# Patient Record
Sex: FEMALE | Race: ASIAN | URBAN - METROPOLITAN AREA
[De-identification: names, ages, dates, MRNs, and addresses within clinical notes are randomized per-mention and may not be internally consistent; named-entity substitution may affect disease eponyms.]

---

## 2018-01-16 ENCOUNTER — APPOINTMENT (OUTPATIENT)
Dept: LAB | Age: 58
End: 2018-01-16
Attending: FAMILY MEDICINE
Payer: COMMERCIAL

## 2018-01-16 ENCOUNTER — OFFICE VISIT (OUTPATIENT)
Dept: FAMILY MEDICINE CLINIC | Facility: CLINIC | Age: 58
End: 2018-01-16

## 2018-01-16 VITALS
HEART RATE: 80 BPM | WEIGHT: 115.38 LBS | TEMPERATURE: 98 F | HEIGHT: 60.39 IN | DIASTOLIC BLOOD PRESSURE: 66 MMHG | RESPIRATION RATE: 16 BRPM | BODY MASS INDEX: 22.36 KG/M2 | SYSTOLIC BLOOD PRESSURE: 98 MMHG

## 2018-01-16 DIAGNOSIS — Z12.31 ENCOUNTER FOR SCREENING MAMMOGRAM FOR BREAST CANCER: ICD-10-CM

## 2018-01-16 DIAGNOSIS — Z00.00 ROUTINE PHYSICAL EXAMINATION: ICD-10-CM

## 2018-01-16 DIAGNOSIS — Z12.11 COLON CANCER SCREENING: ICD-10-CM

## 2018-01-16 DIAGNOSIS — Z00.00 ROUTINE PHYSICAL EXAMINATION: Primary | ICD-10-CM

## 2018-01-16 LAB
ANION GAP SERPL CALC-SCNC: 11 MMOL/L (ref 0–18)
BUN SERPL-MCNC: 11 MG/DL (ref 8–20)
BUN/CREAT SERPL: 17.2 (ref 10–20)
CALCIUM SERPL-MCNC: 9.7 MG/DL (ref 8.5–10.5)
CHLORIDE SERPL-SCNC: 104 MMOL/L (ref 95–110)
CHOLEST SERPL-MCNC: 221 MG/DL (ref 110–200)
CO2 SERPL-SCNC: 25 MMOL/L (ref 22–32)
CREAT SERPL-MCNC: 0.64 MG/DL (ref 0.5–1.5)
GLUCOSE SERPL-MCNC: 110 MG/DL (ref 70–99)
HDLC SERPL-MCNC: 78 MG/DL
LDLC SERPL CALC-MCNC: 122 MG/DL (ref 0–99)
NONHDLC SERPL-MCNC: 143 MG/DL
OSMOLALITY UR CALC.SUM OF ELEC: 290 MOSM/KG (ref 275–295)
POTASSIUM SERPL-SCNC: 3.9 MMOL/L (ref 3.3–5.1)
SODIUM SERPL-SCNC: 140 MMOL/L (ref 136–144)
TRIGL SERPL-MCNC: 107 MG/DL (ref 1–149)

## 2018-01-16 PROCEDURE — 80048 BASIC METABOLIC PNL TOTAL CA: CPT

## 2018-01-16 PROCEDURE — 90715 TDAP VACCINE 7 YRS/> IM: CPT | Performed by: FAMILY MEDICINE

## 2018-01-16 PROCEDURE — 90472 IMMUNIZATION ADMIN EACH ADD: CPT | Performed by: FAMILY MEDICINE

## 2018-01-16 PROCEDURE — 36415 COLL VENOUS BLD VENIPUNCTURE: CPT

## 2018-01-16 PROCEDURE — 90686 IIV4 VACC NO PRSV 0.5 ML IM: CPT | Performed by: FAMILY MEDICINE

## 2018-01-16 PROCEDURE — 90471 IMMUNIZATION ADMIN: CPT | Performed by: FAMILY MEDICINE

## 2018-01-16 PROCEDURE — 99396 PREV VISIT EST AGE 40-64: CPT | Performed by: FAMILY MEDICINE

## 2018-01-16 PROCEDURE — 80061 LIPID PANEL: CPT

## 2018-01-16 PROCEDURE — 86803 HEPATITIS C AB TEST: CPT

## 2018-01-16 NOTE — PROGRESS NOTES
HPI:    Patient ID: Carolyn Fonseca is a 56year old female. HPI    Review of Systems   Constitutional: Negative. Respiratory: Negative. Cardiovascular: Negative. Gastrointestinal: Negative. Skin: Negative. Neurological: Negative. encounter    Imaging & Referrals:  TETANUS, DIPHTHERIA TOXOIDS AND ACELLULAR PERTUSIS VACCINE (TDAP), >7 YEARS, IM USE  FLULAVAL INFLUENZA VACCINE QUAD PRESERVATIVE FREE 0.5 ML  EVALUATE & TREAT, GASTRO (INTERNAL)  LAM SCREENING BILAT (CPT=77067)       ID#

## 2018-01-17 ENCOUNTER — HOSPITAL ENCOUNTER (OUTPATIENT)
Dept: MAMMOGRAPHY | Age: 58
Discharge: HOME | End: 2018-01-17
Attending: FAMILY MEDICINE
Payer: COMMERCIAL

## 2018-01-17 DIAGNOSIS — Z12.31 ENCOUNTER FOR SCREENING MAMMOGRAM FOR BREAST CANCER: ICD-10-CM

## 2018-01-17 LAB — HCV AB SERPL QL IA: NONREACTIVE

## 2018-01-17 PROCEDURE — 77067 SCR MAMMO BI INCL CAD: CPT | Performed by: FAMILY MEDICINE

## 2018-01-24 ENCOUNTER — TELEPHONE (OUTPATIENT)
Dept: FAMILY MEDICINE CLINIC | Facility: CLINIC | Age: 58
End: 2018-01-24

## 2018-01-26 ENCOUNTER — TELEPHONE (OUTPATIENT)
Dept: OTHER | Age: 58
End: 2018-01-26

## 2018-01-26 NOTE — TELEPHONE ENCOUNTER
----- Message from Joaquim Marroquin RN sent at 1/24/2018  3:48 PM CST -----      ----- Message -----  From: Pearl Ivory MD  Sent: 1/16/2018   7:40 PM  To:  Em [facility]    Please let patient know labs normal except blood sugar is upper normal.  I r

## 2018-02-04 NOTE — TELEPHONE ENCOUNTER
Nurse judgement. Unsuccessful reaching pt via phone. Therefore result and MD note mailed to pt for her review.

## 2018-02-07 ENCOUNTER — NURSE TRIAGE (OUTPATIENT)
Dept: OTHER | Age: 58
End: 2018-02-07

## 2018-02-07 ENCOUNTER — OFFICE VISIT (OUTPATIENT)
Dept: FAMILY MEDICINE CLINIC | Facility: CLINIC | Age: 58
End: 2018-02-07

## 2018-02-07 VITALS
HEART RATE: 77 BPM | TEMPERATURE: 98 F | DIASTOLIC BLOOD PRESSURE: 70 MMHG | WEIGHT: 113.63 LBS | BODY MASS INDEX: 22 KG/M2 | RESPIRATION RATE: 12 BRPM | SYSTOLIC BLOOD PRESSURE: 111 MMHG

## 2018-02-07 DIAGNOSIS — R68.89 BLOOD PRESSURE ALTERATION: Primary | ICD-10-CM

## 2018-02-07 DIAGNOSIS — R73.09: ICD-10-CM

## 2018-02-07 PROCEDURE — 99214 OFFICE O/P EST MOD 30 MIN: CPT | Performed by: FAMILY MEDICINE

## 2018-02-07 PROCEDURE — 99212 OFFICE O/P EST SF 10 MIN: CPT | Performed by: FAMILY MEDICINE

## 2018-02-07 NOTE — TELEPHONE ENCOUNTER
Patient informed of results (identified name and ) and recommendations, as per Dr. EMILY ANTOINE [facility] result note. Patient voiced understanding and agrees with recommendations.

## 2018-02-07 NOTE — TELEPHONE ENCOUNTER
Action Requested: Summary for Provider     []  Critical Lab, Recommendations Needed  [] Need Additional Advice  []   FYI    []   Need Orders  [] Need Medications Sent to Pharmacy  []  Other     SUMMARY: Advised OV today with available provider and pt agree

## 2018-02-09 NOTE — PROGRESS NOTES
HPI:    Brad Okeefe is a 56year old female presents to clinic with concerns. States that her Bp has been higher than usual, she usually runs in the 90s/60s.  The other day she was working at her computer and she noticed some dizziness, she took her normal and breath sounds normal. No respiratory distress. She has no wheezes. She has no rales. Neurological: She is alert. Gait normal.   Psychiatric: Affect normal.   Vitals reviewed.           ASSESSMENT/PLAN:   Blood pressure alteration  (primary enco

## 2018-02-22 ENCOUNTER — TELEPHONE (OUTPATIENT)
Dept: GASTROENTEROLOGY | Facility: CLINIC | Age: 58
End: 2018-02-22

## 2018-02-22 ENCOUNTER — OFFICE VISIT (OUTPATIENT)
Dept: GASTROENTEROLOGY | Facility: CLINIC | Age: 58
End: 2018-02-22

## 2018-02-22 VITALS
SYSTOLIC BLOOD PRESSURE: 98 MMHG | DIASTOLIC BLOOD PRESSURE: 62 MMHG | WEIGHT: 112 LBS | HEART RATE: 66 BPM | BODY MASS INDEX: 22.58 KG/M2 | HEIGHT: 59 IN

## 2018-02-22 DIAGNOSIS — Z12.11 SCREEN FOR COLON CANCER: ICD-10-CM

## 2018-02-22 DIAGNOSIS — Z12.11 COLON CANCER SCREENING: Primary | ICD-10-CM

## 2018-02-22 DIAGNOSIS — K59.09 OTHER CONSTIPATION: Primary | ICD-10-CM

## 2018-02-22 PROCEDURE — 99243 OFF/OP CNSLTJ NEW/EST LOW 30: CPT | Performed by: INTERNAL MEDICINE

## 2018-02-22 PROCEDURE — 99212 OFFICE O/P EST SF 10 MIN: CPT | Performed by: INTERNAL MEDICINE

## 2018-02-22 RX ORDER — ECHINACEA 400 MG
CAPSULE ORAL
COMMUNITY

## 2018-02-22 RX ORDER — CHOLECALCIFEROL (VITAMIN D3) 125 MCG
100 CAPSULE ORAL DAILY
COMMUNITY

## 2018-02-22 RX ORDER — CHOLECALCIFEROL (VITAMIN D3) 50 MCG
CAPSULE ORAL
COMMUNITY

## 2018-02-22 NOTE — PATIENT INSTRUCTIONS
1. Schedule colonoscopy with IV sedation (screening)    2.  bowel prep from pharmacy (split suprep)    3. Continue all medications for procedure    4. Read all bowel prep instructions carefully    5.  AVOID seeds, nuts, popcorn, raw fruits and vegeta

## 2018-02-22 NOTE — TELEPHONE ENCOUNTER
Pt will call back to schedule    Scheduled for:  Colonoscopy 20071  Provider Name: Dr James Bustos  Date:  Wed 3/21/18  Location:  [facility]  Sedation:  IV  Time:  12:30 pm  Prep: split dose suprep, 2nd dose @ 6:30 am  Meds/Allergies Reconciled?:  NKDA  Diagnosis with

## 2018-02-22 NOTE — H&P
[address] [facility] [address] Gastroenterology                                                                                                  Clinic History and Physical     Pa Allergies:  No Known Allergies    ROS:   CONSTITUTIONAL:  negative for fevers, rigors  EYES:  negative for diplopia   RESPIRATORY:  negative for severe shortness of breath  CARDIOVASCULAR:  negative for crushing sub-sternal chest pain  GASTROINTESTIN they want to proceed with colonoscopy.     Recommend:  -Schedule colonoscopy w/IV sedation  -Prep: suprep    Colonoscopy consent: I have discussed the risks, benefits, and alternatives to colonoscopy with the patient [who demonstrated understanding], includ

## 2018-02-23 NOTE — TELEPHONE ENCOUNTER
Pt returned my call but I was unable to  call. I called back and spoke to pt and scheduled colonoscopy procedure for 3/21/18 @ 12:30 pm @ [facility] .  I explained the arrival time and location to pt and reminded her of the 2nd bowel prep dose @ 6:30 am on

## 2018-03-21 ENCOUNTER — SURGERY (OUTPATIENT)
Age: 58
End: 2018-03-21

## 2018-03-21 ENCOUNTER — HOSPITAL ENCOUNTER (OUTPATIENT)
Facility: HOSPITAL | Age: 58
Setting detail: HOSPITAL OUTPATIENT SURGERY
Discharge: HOME | End: 2018-03-21
Attending: INTERNAL MEDICINE | Admitting: INTERNAL MEDICINE
Payer: COMMERCIAL

## 2018-03-21 DIAGNOSIS — Z12.11 COLON CANCER SCREENING: ICD-10-CM

## 2018-03-21 PROCEDURE — 0DJD8ZZ INSPECTION OF LOWER INTESTINAL TRACT, VIA NATURAL OR ARTIFICIAL OPENING ENDOSCOPIC: ICD-10-PCS | Performed by: INTERNAL MEDICINE

## 2018-03-21 PROCEDURE — G0500 MOD SEDAT ENDO SERVICE >5YRS: HCPCS | Performed by: INTERNAL MEDICINE

## 2018-03-21 PROCEDURE — 45378 DIAGNOSTIC COLONOSCOPY: CPT | Performed by: INTERNAL MEDICINE

## 2018-03-21 RX ORDER — MIDAZOLAM HYDROCHLORIDE 1 MG/ML
INJECTION INTRAMUSCULAR; INTRAVENOUS
Status: DISCONTINUED | OUTPATIENT
Start: 2018-03-21 | End: 2018-03-21

## 2018-03-21 RX ORDER — SODIUM CHLORIDE 0.9 % (FLUSH) 0.9 %
10 SYRINGE (ML) INJECTION AS NEEDED
Status: DISCONTINUED | OUTPATIENT
Start: 2018-03-21 | End: 2018-03-21

## 2018-03-21 RX ORDER — SODIUM CHLORIDE, SODIUM LACTATE, POTASSIUM CHLORIDE, CALCIUM CHLORIDE 600; 310; 30; 20 MG/100ML; MG/100ML; MG/100ML; MG/100ML
INJECTION, SOLUTION INTRAVENOUS CONTINUOUS
Status: DISCONTINUED | OUTPATIENT
Start: 2018-03-21 | End: 2018-03-21

## 2018-03-21 RX ORDER — MIDAZOLAM HYDROCHLORIDE 1 MG/ML
1 INJECTION INTRAMUSCULAR; INTRAVENOUS EVERY 5 MIN PRN
Status: DISCONTINUED | OUTPATIENT
Start: 2018-03-21 | End: 2018-03-21

## 2018-03-21 NOTE — H&P
History & Physical Examination    Patient Name: Audrey Gilbert  MRN: [id number]  [facility]: [id number]  YOB: 1960    Diagnosis: screening for colon cancer      Prescriptions Prior to Admission:  Na Sulfate-K Sulfate-Mg Sulf (SUPREP BOWEL PREP KIT

## 2018-03-21 NOTE — OPERATIVE REPORT
COLONOSCOPY REPORT    Amaya Alcazar     1960 Age 56year old   PCP Jenniffer Loo MD Endoscopist Gus Barboza MD     Date of procedure: 18    Procedure: Colonoscopy     Pre-operative diagnosis: Screening    Post-operative diagnos colon showed normal vascular pattern, without evidence of angioectasias or inflammation. 6. HAILE: normal rectal tone, no masses palpated. Impression:   · Small internal hemorrhoids, otherwise normal colonoscopy to the terminal ileum.      Recommend:

## 2018-03-22 VITALS
DIASTOLIC BLOOD PRESSURE: 62 MMHG | OXYGEN SATURATION: 100 % | HEIGHT: 60 IN | SYSTOLIC BLOOD PRESSURE: 96 MMHG | HEART RATE: 51 BPM | BODY MASS INDEX: 21.01 KG/M2 | RESPIRATION RATE: 16 BRPM | WEIGHT: 107 LBS

## 2018-03-23 ENCOUNTER — TELEPHONE (OUTPATIENT)
Dept: GASTROENTEROLOGY | Facility: CLINIC | Age: 58
End: 2018-03-23

## 2018-03-26 NOTE — TELEPHONE ENCOUNTER
Entered into EPIC:Recall colon in 10 years per Dr. Von Berry. Last Colon done 3/21/2018, next due 3/21/2028. Snapshot updated.

## 2019-05-28 ENCOUNTER — OFFICE VISIT (OUTPATIENT)
Dept: FAMILY MEDICINE CLINIC | Facility: CLINIC | Age: 59
End: 2019-05-28
Payer: COMMERCIAL

## 2019-05-28 ENCOUNTER — APPOINTMENT (OUTPATIENT)
Dept: LAB | Age: 59
End: 2019-05-28
Attending: FAMILY MEDICINE
Payer: COMMERCIAL

## 2019-05-28 VITALS
BODY MASS INDEX: 20.59 KG/M2 | RESPIRATION RATE: 18 BRPM | DIASTOLIC BLOOD PRESSURE: 61 MMHG | HEIGHT: 60.5 IN | TEMPERATURE: 98 F | SYSTOLIC BLOOD PRESSURE: 93 MMHG | WEIGHT: 107.63 LBS | HEART RATE: 69 BPM

## 2019-05-28 DIAGNOSIS — R73.9 HYPERGLYCEMIA: ICD-10-CM

## 2019-05-28 DIAGNOSIS — Z12.31 ENCOUNTER FOR SCREENING MAMMOGRAM FOR BREAST CANCER: ICD-10-CM

## 2019-05-28 DIAGNOSIS — Z00.00 ROUTINE PHYSICAL EXAMINATION: Primary | ICD-10-CM

## 2019-05-28 DIAGNOSIS — Z00.00 ROUTINE PHYSICAL EXAMINATION: ICD-10-CM

## 2019-05-28 DIAGNOSIS — Z20.828 EXPOSURE TO VIRAL DISEASE: ICD-10-CM

## 2019-05-28 PROCEDURE — 80048 BASIC METABOLIC PNL TOTAL CA: CPT

## 2019-05-28 PROCEDURE — 36415 COLL VENOUS BLD VENIPUNCTURE: CPT

## 2019-05-28 PROCEDURE — 99396 PREV VISIT EST AGE 40-64: CPT | Performed by: FAMILY MEDICINE

## 2019-05-28 PROCEDURE — 83036 HEMOGLOBIN GLYCOSYLATED A1C: CPT

## 2019-05-28 PROCEDURE — 80061 LIPID PANEL: CPT

## 2019-06-04 ENCOUNTER — HOSPITAL ENCOUNTER (OUTPATIENT)
Dept: MAMMOGRAPHY | Age: 59
Discharge: HOME | End: 2019-06-04
Attending: FAMILY MEDICINE
Payer: COMMERCIAL

## 2019-06-04 DIAGNOSIS — Z12.31 ENCOUNTER FOR SCREENING MAMMOGRAM FOR BREAST CANCER: ICD-10-CM

## 2019-06-04 PROCEDURE — 77063 BREAST TOMOSYNTHESIS BI: CPT | Performed by: FAMILY MEDICINE

## 2019-06-04 PROCEDURE — 77067 SCR MAMMO BI INCL CAD: CPT | Performed by: FAMILY MEDICINE

## 2020-07-03 ENCOUNTER — OFFICE VISIT (OUTPATIENT)
Dept: FAMILY MEDICINE CLINIC | Facility: CLINIC | Age: 60
End: 2020-07-03
Payer: COMMERCIAL

## 2020-07-03 ENCOUNTER — APPOINTMENT (OUTPATIENT)
Dept: LAB | Age: 60
End: 2020-07-03
Attending: FAMILY MEDICINE
Payer: COMMERCIAL

## 2020-07-03 VITALS
HEART RATE: 64 BPM | BODY MASS INDEX: 20.81 KG/M2 | DIASTOLIC BLOOD PRESSURE: 60 MMHG | RESPIRATION RATE: 16 BRPM | HEIGHT: 60.5 IN | WEIGHT: 108.81 LBS | TEMPERATURE: 97 F | SYSTOLIC BLOOD PRESSURE: 98 MMHG

## 2020-07-03 DIAGNOSIS — Z20.828 EXPOSURE TO VIRAL DISEASE: ICD-10-CM

## 2020-07-03 DIAGNOSIS — Z00.00 ROUTINE PHYSICAL EXAMINATION: ICD-10-CM

## 2020-07-03 DIAGNOSIS — Z12.31 ENCOUNTER FOR SCREENING MAMMOGRAM FOR BREAST CANCER: Primary | ICD-10-CM

## 2020-07-03 PROBLEM — Z90.710 STATUS POST HYSTERECTOMY: Status: ACTIVE | Noted: 2020-07-03

## 2020-07-03 LAB
ANION GAP SERPL CALC-SCNC: 3 MMOL/L (ref 0–18)
BUN BLD-MCNC: 12 MG/DL (ref 7–18)
BUN/CREAT SERPL: 14.6 (ref 10–20)
CALCIUM BLD-MCNC: 9.4 MG/DL (ref 8.5–10.1)
CHLORIDE SERPL-SCNC: 107 MMOL/L (ref 98–112)
CHOLEST SMN-MCNC: 185 MG/DL (ref ?–200)
CO2 SERPL-SCNC: 32 MMOL/L (ref 21–32)
CREAT BLD-MCNC: 0.82 MG/DL (ref 0.55–1.02)
GLUCOSE BLD-MCNC: 91 MG/DL (ref 70–99)
HDLC SERPL-MCNC: 91 MG/DL (ref 40–59)
LDLC SERPL CALC-MCNC: 82 MG/DL (ref ?–100)
MEV IGG SER-ACNC: >300 AU/ML (ref 16.5–?)
NONHDLC SERPL-MCNC: 94 MG/DL (ref ?–130)
OSMOLALITY SERPL CALC.SUM OF ELEC: 293 MOSM/KG (ref 275–295)
PATIENT FASTING Y/N/NP: YES
PATIENT FASTING Y/N/NP: YES
POTASSIUM SERPL-SCNC: 4.4 MMOL/L (ref 3.5–5.1)
SODIUM SERPL-SCNC: 142 MMOL/L (ref 136–145)
TRIGL SERPL-MCNC: 61 MG/DL (ref 30–149)
TSI SER-ACNC: 3.07 MIU/ML (ref 0.36–3.74)
VLDLC SERPL CALC-MCNC: 12 MG/DL (ref 0–30)

## 2020-07-03 PROCEDURE — 90632 HEPA VACCINE ADULT IM: CPT | Performed by: FAMILY MEDICINE

## 2020-07-03 PROCEDURE — 99396 PREV VISIT EST AGE 40-64: CPT | Performed by: FAMILY MEDICINE

## 2020-07-03 PROCEDURE — 80048 BASIC METABOLIC PNL TOTAL CA: CPT

## 2020-07-03 PROCEDURE — 36415 COLL VENOUS BLD VENIPUNCTURE: CPT

## 2020-07-03 PROCEDURE — 84443 ASSAY THYROID STIM HORMONE: CPT

## 2020-07-03 PROCEDURE — 86765 RUBEOLA ANTIBODY: CPT

## 2020-07-03 PROCEDURE — 80061 LIPID PANEL: CPT

## 2020-07-03 PROCEDURE — 90471 IMMUNIZATION ADMIN: CPT | Performed by: FAMILY MEDICINE

## 2020-07-03 NOTE — PROGRESS NOTES
HPI:    Patient ID: Lea White is a 64year old female. HPI    Review of Systems   Constitutional: Negative. Respiratory: Negative. Cardiovascular: Negative. Gastrointestinal: Negative. Skin: Negative. Neurological: Negative. requested or ordered in this encounter       Imaging & Referrals:  HEPATITIS A VACCINE  [facility]ACHO 2D+3D SCREENING BILAT (CPT=77067/32988)       BE#[id number]

## 2020-07-10 ENCOUNTER — HOSPITAL ENCOUNTER (OUTPATIENT)
Dept: MAMMOGRAPHY | Age: 60
Discharge: HOME | End: 2020-07-10
Attending: FAMILY MEDICINE
Payer: COMMERCIAL

## 2020-07-10 DIAGNOSIS — Z12.31 ENCOUNTER FOR SCREENING MAMMOGRAM FOR BREAST CANCER: ICD-10-CM

## 2020-07-10 PROCEDURE — 77067 SCR MAMMO BI INCL CAD: CPT | Performed by: FAMILY MEDICINE

## 2020-07-10 PROCEDURE — 77063 BREAST TOMOSYNTHESIS BI: CPT | Performed by: FAMILY MEDICINE

## 2021-05-21 ENCOUNTER — TELEPHONE (OUTPATIENT)
Dept: FAMILY MEDICINE CLINIC | Facility: CLINIC | Age: 61
End: 2021-05-21

## 2021-05-21 DIAGNOSIS — Z12.31 ENCOUNTER FOR SCREENING MAMMOGRAM FOR MALIGNANT NEOPLASM OF BREAST: Primary | ICD-10-CM

## 2021-07-13 ENCOUNTER — HOSPITAL ENCOUNTER (OUTPATIENT)
Dept: MAMMOGRAPHY | Age: 61
Discharge: HOME | End: 2021-07-13
Attending: FAMILY MEDICINE
Payer: COMMERCIAL

## 2021-07-13 DIAGNOSIS — Z12.31 ENCOUNTER FOR SCREENING MAMMOGRAM FOR MALIGNANT NEOPLASM OF BREAST: ICD-10-CM

## 2021-07-13 PROCEDURE — 77067 SCR MAMMO BI INCL CAD: CPT | Performed by: FAMILY MEDICINE

## 2021-07-13 PROCEDURE — 77063 BREAST TOMOSYNTHESIS BI: CPT | Performed by: FAMILY MEDICINE

## 2021-07-20 ENCOUNTER — OFFICE VISIT (OUTPATIENT)
Dept: FAMILY MEDICINE CLINIC | Facility: CLINIC | Age: 61
End: 2021-07-20
Payer: COMMERCIAL

## 2021-07-20 ENCOUNTER — LAB ENCOUNTER (OUTPATIENT)
Dept: LAB | Age: 61
End: 2021-07-20
Attending: FAMILY MEDICINE
Payer: COMMERCIAL

## 2021-07-20 VITALS
SYSTOLIC BLOOD PRESSURE: 98 MMHG | WEIGHT: 109.81 LBS | BODY MASS INDEX: 21 KG/M2 | HEART RATE: 73 BPM | RESPIRATION RATE: 16 BRPM | HEIGHT: 60.5 IN | DIASTOLIC BLOOD PRESSURE: 54 MMHG | TEMPERATURE: 97 F

## 2021-07-20 DIAGNOSIS — L81.9 HYPOPIGMENTATION: ICD-10-CM

## 2021-07-20 DIAGNOSIS — Z00.00 ROUTINE PHYSICAL EXAMINATION: Primary | ICD-10-CM

## 2021-07-20 DIAGNOSIS — Z00.00 ROUTINE PHYSICAL EXAMINATION: ICD-10-CM

## 2021-07-20 DIAGNOSIS — J02.9 SORE THROAT: ICD-10-CM

## 2021-07-20 LAB
ANION GAP SERPL CALC-SCNC: 4 MMOL/L (ref 0–18)
BUN BLD-MCNC: 11 MG/DL (ref 7–18)
BUN/CREAT SERPL: 14.5 (ref 10–20)
CALCIUM BLD-MCNC: 9.5 MG/DL (ref 8.5–10.1)
CHLORIDE SERPL-SCNC: 109 MMOL/L (ref 98–112)
CHOLEST SMN-MCNC: 200 MG/DL (ref ?–200)
CO2 SERPL-SCNC: 30 MMOL/L (ref 21–32)
CREAT BLD-MCNC: 0.76 MG/DL
DEPRECATED RDW RBC AUTO: 44.8 FL (ref 35.1–46.3)
ERYTHROCYTE [DISTWIDTH] IN BLOOD BY AUTOMATED COUNT: 12.4 % (ref 11–15)
GLUCOSE BLD-MCNC: 94 MG/DL (ref 70–99)
HCT VFR BLD AUTO: 39.2 %
HDLC SERPL-MCNC: 84 MG/DL (ref 40–59)
HGB BLD-MCNC: 12.3 G/DL
LDLC SERPL CALC-MCNC: 102 MG/DL (ref ?–100)
MCH RBC QN AUTO: 30.5 PG (ref 26–34)
MCHC RBC AUTO-ENTMCNC: 31.4 G/DL (ref 31–37)
MCV RBC AUTO: 97.3 FL
NONHDLC SERPL-MCNC: 116 MG/DL (ref ?–130)
OSMOLALITY SERPL CALC.SUM OF ELEC: 295 MOSM/KG (ref 275–295)
PATIENT FASTING Y/N/NP: YES
PATIENT FASTING Y/N/NP: YES
PLATELET # BLD AUTO: 178 10(3)UL (ref 150–450)
POTASSIUM SERPL-SCNC: 4.3 MMOL/L (ref 3.5–5.1)
RBC # BLD AUTO: 4.03 X10(6)UL
SODIUM SERPL-SCNC: 143 MMOL/L (ref 136–145)
TRIGL SERPL-MCNC: 81 MG/DL (ref 30–149)
TSI SER-ACNC: 2.75 MIU/ML (ref 0.36–3.74)
VLDLC SERPL CALC-MCNC: 13 MG/DL (ref 0–30)
WBC # BLD AUTO: 3.3 X10(3) UL (ref 4–11)

## 2021-07-20 PROCEDURE — 36415 COLL VENOUS BLD VENIPUNCTURE: CPT

## 2021-07-20 PROCEDURE — 84443 ASSAY THYROID STIM HORMONE: CPT

## 2021-07-20 PROCEDURE — 3078F DIAST BP <80 MM HG: CPT | Performed by: FAMILY MEDICINE

## 2021-07-20 PROCEDURE — 80048 BASIC METABOLIC PNL TOTAL CA: CPT

## 2021-07-20 PROCEDURE — 99396 PREV VISIT EST AGE 40-64: CPT | Performed by: FAMILY MEDICINE

## 2021-07-20 PROCEDURE — 3008F BODY MASS INDEX DOCD: CPT | Performed by: FAMILY MEDICINE

## 2021-07-20 PROCEDURE — 3074F SYST BP LT 130 MM HG: CPT | Performed by: FAMILY MEDICINE

## 2021-07-20 PROCEDURE — 85027 COMPLETE CBC AUTOMATED: CPT

## 2021-07-20 PROCEDURE — 80061 LIPID PANEL: CPT

## 2021-07-20 NOTE — PROGRESS NOTES
HPI/Subjective:   Patient ID: Martha Barcenas is a 64year old female. Patient presents for routine physical.      History/Other:   Review of Systems   Constitutional: Negative. HENT: Positive for sore throat. Respiratory: Negative.     Cardiovasc up-to-date    Hypopigmentation approximately 6 x 4 cm hyperpigmented macule right neck/clavicular region. Suspect vitiligo. Referred to dermatology. Sore throat–patient with sore throat and mild hoarseness past 2 to 3 days.   No fever, loss of taste

## 2021-07-20 NOTE — PATIENT INSTRUCTIONS
Understanding Vitiligo  Vitiligo is a common skin disorder. It makes the skin lose its natural color (pigmentation). People with vitiligo have light-colored patches on their skin.  These patches may be in one small area or they may be spread all over the body skin exam to see how widespread the condition is. He or she will also take a full health history. You may have a blood test to check if your thyroid is healthy.    The provider may also shine a black light (UV or ultraviolet light) on your skin in a da The person is then left with white skin. Alternative treatments  You may hear about some alternative treatments and wonder if they are right for you. These include some vitamins, herbs, and certain kinds of light therapies.  Talk with your healthcare provi

## 2021-11-19 ENCOUNTER — OFFICE VISIT (OUTPATIENT)
Dept: DERMATOLOGY CLINIC | Facility: CLINIC | Age: 61
End: 2021-11-19
Payer: COMMERCIAL

## 2021-11-19 DIAGNOSIS — L81.8 IDIOPATHIC GUTTATE HYPOMELANOSIS: ICD-10-CM

## 2021-11-19 DIAGNOSIS — L82.1 SEBORRHEIC KERATOSES: ICD-10-CM

## 2021-11-19 DIAGNOSIS — L80 VITILIGO: Primary | ICD-10-CM

## 2021-11-19 DIAGNOSIS — D23.9 BENIGN NEOPLASM OF SKIN, UNSPECIFIED LOCATION: ICD-10-CM

## 2021-11-19 PROCEDURE — 99203 OFFICE O/P NEW LOW 30 MIN: CPT | Performed by: DERMATOLOGY

## 2021-11-19 RX ORDER — CHLORHEXIDINE GLUCONATE 0.12 MG/ML
RINSE ORAL
COMMUNITY
Start: 2021-09-01

## 2021-11-19 RX ORDER — AMOXICILLIN 500 MG/1
CAPSULE ORAL
COMMUNITY
Start: 2021-09-01

## 2021-11-29 NOTE — PROGRESS NOTES
Adriana Jordan is a 63year old female. Patient presents with:  Derm Problem: New pt. pt presenting today with disoloration to L side of neck for 2 years. Denies pain or itching. pt hasn't tried any topicals or creams.  Requesting recommendations not taking: Reported on 11/19/2021)       Allergies:   No Known Allergies    Past Medical History:   Diagnosis Date   • Anemia    • Screen for colon cancer 03/2018    no polyps;repeat in 10 years     Past Surgical History:   Procedure Laterality Date   • C discoloration on neck. The right neck. Has noted other white spots. Asymptomatic. Not of cosmetic concern  No family history of vitiligo. Patient is otherwise generally been healthy    Patient presents with concerns above.       ROS:    Denies any othe of melanoma briefly reviewed. Sunscreen use, sun protection, encouraged. Followup as noted in rtc or p.r.n. The patient indicates understanding of these issues and agrees to the plan.   The patient is asked to return as noted in follow-up /as noted abo

## 2022-07-22 ENCOUNTER — OFFICE VISIT (OUTPATIENT)
Dept: FAMILY MEDICINE CLINIC | Facility: CLINIC | Age: 62
End: 2022-07-22
Payer: COMMERCIAL

## 2022-07-22 ENCOUNTER — LAB ENCOUNTER (OUTPATIENT)
Dept: LAB | Age: 62
End: 2022-07-22
Attending: FAMILY MEDICINE
Payer: COMMERCIAL

## 2022-07-22 VITALS
HEIGHT: 60.5 IN | WEIGHT: 108.38 LBS | DIASTOLIC BLOOD PRESSURE: 68 MMHG | TEMPERATURE: 98 F | SYSTOLIC BLOOD PRESSURE: 101 MMHG | BODY MASS INDEX: 20.73 KG/M2 | HEART RATE: 60 BPM

## 2022-07-22 DIAGNOSIS — Z00.00 ROUTINE PHYSICAL EXAMINATION: ICD-10-CM

## 2022-07-22 DIAGNOSIS — H35.3130 BILATERAL NONEXUDATIVE AGE-RELATED MACULAR DEGENERATION, UNSPECIFIED STAGE: ICD-10-CM

## 2022-07-22 DIAGNOSIS — Z00.00 ROUTINE PHYSICAL EXAMINATION: Primary | ICD-10-CM

## 2022-07-22 DIAGNOSIS — Z12.31 ENCOUNTER FOR SCREENING MAMMOGRAM FOR MALIGNANT NEOPLASM OF BREAST: ICD-10-CM

## 2022-07-22 LAB
ANION GAP SERPL CALC-SCNC: 6 MMOL/L (ref 0–18)
BUN BLD-MCNC: 16 MG/DL (ref 7–18)
BUN/CREAT SERPL: 20.8 (ref 10–20)
CALCIUM BLD-MCNC: 9.4 MG/DL (ref 8.5–10.1)
CHLORIDE SERPL-SCNC: 107 MMOL/L (ref 98–112)
CHOLEST SERPL-MCNC: 201 MG/DL (ref ?–200)
CO2 SERPL-SCNC: 29 MMOL/L (ref 21–32)
CREAT BLD-MCNC: 0.77 MG/DL
DEPRECATED RDW RBC AUTO: 43.8 FL (ref 35.1–46.3)
ERYTHROCYTE [DISTWIDTH] IN BLOOD BY AUTOMATED COUNT: 12.3 % (ref 11–15)
FASTING PATIENT LIPID ANSWER: YES
FASTING STATUS PATIENT QL REPORTED: YES
GLUCOSE BLD-MCNC: 97 MG/DL (ref 70–99)
HCT VFR BLD AUTO: 40.3 %
HDLC SERPL-MCNC: 79 MG/DL (ref 40–59)
HGB BLD-MCNC: 12.8 G/DL
LDLC SERPL CALC-MCNC: 107 MG/DL (ref ?–100)
MCH RBC QN AUTO: 30.7 PG (ref 26–34)
MCHC RBC AUTO-ENTMCNC: 31.8 G/DL (ref 31–37)
MCV RBC AUTO: 96.6 FL
NONHDLC SERPL-MCNC: 122 MG/DL (ref ?–130)
OSMOLALITY SERPL CALC.SUM OF ELEC: 295 MOSM/KG (ref 275–295)
PLATELET # BLD AUTO: 206 10(3)UL (ref 150–450)
POTASSIUM SERPL-SCNC: 4 MMOL/L (ref 3.5–5.1)
RBC # BLD AUTO: 4.17 X10(6)UL
SODIUM SERPL-SCNC: 142 MMOL/L (ref 136–145)
TRIGL SERPL-MCNC: 82 MG/DL (ref 30–149)
VLDLC SERPL CALC-MCNC: 14 MG/DL (ref 0–30)
WBC # BLD AUTO: 3.8 X10(3) UL (ref 4–11)

## 2022-07-22 PROCEDURE — 80048 BASIC METABOLIC PNL TOTAL CA: CPT

## 2022-07-22 PROCEDURE — 36415 COLL VENOUS BLD VENIPUNCTURE: CPT

## 2022-07-22 PROCEDURE — 80061 LIPID PANEL: CPT | Performed by: FAMILY MEDICINE

## 2022-07-22 PROCEDURE — 3078F DIAST BP <80 MM HG: CPT | Performed by: FAMILY MEDICINE

## 2022-07-22 PROCEDURE — 3008F BODY MASS INDEX DOCD: CPT | Performed by: FAMILY MEDICINE

## 2022-07-22 PROCEDURE — 99396 PREV VISIT EST AGE 40-64: CPT | Performed by: FAMILY MEDICINE

## 2022-07-22 PROCEDURE — 3074F SYST BP LT 130 MM HG: CPT | Performed by: FAMILY MEDICINE

## 2022-07-22 PROCEDURE — 85027 COMPLETE CBC AUTOMATED: CPT

## 2022-08-17 ENCOUNTER — HOSPITAL ENCOUNTER (OUTPATIENT)
Dept: MAMMOGRAPHY | Age: 62
Discharge: HOME | End: 2022-08-17
Attending: FAMILY MEDICINE
Payer: COMMERCIAL

## 2022-08-17 DIAGNOSIS — Z12.31 ENCOUNTER FOR SCREENING MAMMOGRAM FOR MALIGNANT NEOPLASM OF BREAST: ICD-10-CM

## 2022-08-17 PROCEDURE — 77067 SCR MAMMO BI INCL CAD: CPT | Performed by: FAMILY MEDICINE

## 2022-08-17 PROCEDURE — 77063 BREAST TOMOSYNTHESIS BI: CPT | Performed by: FAMILY MEDICINE

## 2023-07-25 ENCOUNTER — HOSPITAL ENCOUNTER (OUTPATIENT)
Age: 63
Discharge: HOME | End: 2023-07-25
Payer: COMMERCIAL

## 2023-07-25 ENCOUNTER — OFFICE VISIT (OUTPATIENT)
Dept: FAMILY MEDICINE CLINIC | Facility: CLINIC | Age: 63
End: 2023-07-25

## 2023-07-25 VITALS
BODY MASS INDEX: 21.47 KG/M2 | WEIGHT: 109.38 LBS | TEMPERATURE: 97 F | DIASTOLIC BLOOD PRESSURE: 63 MMHG | HEIGHT: 60 IN | SYSTOLIC BLOOD PRESSURE: 96 MMHG | HEART RATE: 71 BPM

## 2023-07-25 VITALS
OXYGEN SATURATION: 100 % | HEART RATE: 68 BPM | RESPIRATION RATE: 20 BRPM | TEMPERATURE: 98 F | DIASTOLIC BLOOD PRESSURE: 62 MMHG | SYSTOLIC BLOOD PRESSURE: 104 MMHG

## 2023-07-25 DIAGNOSIS — Z00.00 ENCOUNTER FOR ANNUAL PHYSICAL EXAM: Primary | ICD-10-CM

## 2023-07-25 DIAGNOSIS — U07.1 COVID-19 VIRUS INFECTION: Primary | ICD-10-CM

## 2023-07-25 DIAGNOSIS — Z12.31 BREAST CANCER SCREENING BY MAMMOGRAM: ICD-10-CM

## 2023-07-25 DIAGNOSIS — Z20.822 ENCOUNTER FOR LABORATORY TESTING FOR COVID-19 VIRUS: ICD-10-CM

## 2023-07-25 DIAGNOSIS — J02.9 SORE THROAT: ICD-10-CM

## 2023-07-25 LAB
CONTROL LINE PRESENT WITH A CLEAR BACKGROUND (YES/NO): YES YES/NO
KIT LOT #: NORMAL NUMERIC
SARS-COV-2 RNA RESP QL NAA+PROBE: DETECTED
STREP GRP A CUL-SCR: NEGATIVE

## 2023-07-25 PROCEDURE — 87880 STREP A ASSAY W/OPTIC: CPT | Performed by: FAMILY MEDICINE

## 2023-07-25 PROCEDURE — U0002 COVID-19 LAB TEST NON-CDC: HCPCS | Performed by: NURSE PRACTITIONER

## 2023-07-25 PROCEDURE — 99203 OFFICE O/P NEW LOW 30 MIN: CPT | Performed by: NURSE PRACTITIONER

## 2023-07-25 PROCEDURE — 3008F BODY MASS INDEX DOCD: CPT | Performed by: FAMILY MEDICINE

## 2023-07-25 PROCEDURE — 99396 PREV VISIT EST AGE 40-64: CPT | Performed by: FAMILY MEDICINE

## 2023-07-25 PROCEDURE — 3074F SYST BP LT 130 MM HG: CPT | Performed by: FAMILY MEDICINE

## 2023-07-25 PROCEDURE — 3078F DIAST BP <80 MM HG: CPT | Performed by: FAMILY MEDICINE

## 2023-07-25 NOTE — PROGRESS NOTES
Subjective:   Patient ID: Sabina Storey is a 63year old female. Patient presents for routine physical        History/Other:   Review of Systems   Constitutional: Negative. HENT:  Positive for sore throat. Respiratory: Negative. Cardiovascular: Negative. Gastrointestinal: Negative. Skin: Negative. Neurological: Negative. Current Outpatient Medications   Medication Sig Dispense Refill    Chlorhexidine Gluconate 0.12 % Mouth/Throat Solution USE 1 RINSE WITH 15ML TWICE DAILY THEN EXPECTORATE      Multiple Vitamins-Minerals (WOMENS 50+ MULTI VITAMIN/MIN) Oral Tab Take by mouth. B Complex-C-Folic Acid ( VITAMIN B COMPLEX/VITAMIN C) Oral Tab Take by mouth. Flaxseed, Linseed, (FLAXSEED OIL) 1000 MG Oral Cap Take by mouth. Coenzyme Q-10 100 MG Oral Cap Take 100 mg by mouth daily. Allergies:No Known Allergies    Objective:   Physical Exam  Constitutional:       Appearance: Normal appearance. HENT:      Mouth/Throat:      Pharynx: Posterior oropharyngeal erythema present. No oropharyngeal exudate. Cardiovascular:      Rate and Rhythm: Normal rate and regular rhythm. Heart sounds: Normal heart sounds. Pulmonary:      Effort: Pulmonary effort is normal.      Breath sounds: Normal breath sounds. Comments: Breast-normal appearance, no masses  Abdominal:      Palpations: Abdomen is soft. There is no mass. Tenderness: There is no abdominal tenderness. Lymphadenopathy:      Cervical: No cervical adenopathy. Skin:     General: Skin is warm and dry. Neurological:      Mental Status: She is alert and oriented to person, place, and time. Assessment & Plan:   Encounter for annual physical exam  (primary encounter diagnosis)-patient is , adult son. Continuing his . Labs done today  Mammogram order given  Colonoscopy up-to-date    Sore throat-for the past 1 to 2 days.  recently has had cough. Discussed symptomatic care. Rapid strep negative. COVID testing done.     Orders Placed This Encounter      CBC, Platelet, No Differential [E]      Lipid Panel [E]      Basic Metabolic Panel (8) [E]      POC Rapid Strep [53930]      SARS-CoV-2 by PCR (Garry)      Meds This Visit:  Requested Prescriptions      No prescriptions requested or ordered in this encounter       Imaging & Referrals:  [facility] CHACHO 2D+3D SCREENING BILAT (CPT=77067/71796)

## 2023-07-25 NOTE — ED INITIAL ASSESSMENT (HPI)
Pt here with complaints of body aches and sore throat for 2 days ,pt states she just return for a vacation from [place], pt denies any fevers or sob

## 2023-09-27 ENCOUNTER — HOSPITAL ENCOUNTER (OUTPATIENT)
Dept: MAMMOGRAPHY | Age: 63
Discharge: HOME | End: 2023-09-27
Attending: FAMILY MEDICINE
Payer: COMMERCIAL

## 2023-09-27 DIAGNOSIS — Z12.31 BREAST CANCER SCREENING BY MAMMOGRAM: ICD-10-CM

## 2023-09-27 PROCEDURE — 77063 BREAST TOMOSYNTHESIS BI: CPT | Performed by: FAMILY MEDICINE

## 2023-09-27 PROCEDURE — 77067 SCR MAMMO BI INCL CAD: CPT | Performed by: FAMILY MEDICINE

## 2024-01-05 ENCOUNTER — TELEPHONE (OUTPATIENT)
Dept: FAMILY MEDICINE CLINIC | Facility: CLINIC | Age: 64
End: 2024-01-05

## 2024-01-05 NOTE — TELEPHONE ENCOUNTER
LESLY Garza - 1/24/24 Traveling to [place].   Will contact Travel Medicine regarding: Malaria Medication          RN called patient . Patient's date of birth and full name both confirmed.   Per [facility] - Travel Medicine Information:    Services offered at:  [address], [address]  [place] [address]  To schedule an appointment, call [phone].    RN provided this information to patient, advised to call today.   And if any further assistance needed, call our office.   She verbalizes understanding of all information, and agreeable to plan.

## 2024-01-05 NOTE — TELEPHONE ENCOUNTER
Patient states that she is travelling to [facility] on 1/24/2024 for 3 weeks, so the patient wants to know if Dr Garza is able to prescribe a Medication for the Malaria, as her pharmacy is recommending for the patient to take the pill before she leaves?

## 2024-01-05 NOTE — TELEPHONE ENCOUNTER
Spoke with pt,  verified  Pt informed of MD recommendation, pt stated understanding.  Appt made with MD for eval & treat.        Future Appointments   Date Time Provider Department Center   1/10/2024 10:45 AM Jenniffer Garza MD [facility]

## 2024-01-05 NOTE — TELEPHONE ENCOUNTER
Please let patient know I also can provide travel medicine.  Okay to make appointment with me ASAP, best in office, bring itinerary, okay to give res24.

## 2024-01-10 ENCOUNTER — OFFICE VISIT (OUTPATIENT)
Dept: FAMILY MEDICINE CLINIC | Facility: CLINIC | Age: 64
End: 2024-01-10

## 2024-01-10 VITALS
WEIGHT: 110 LBS | HEART RATE: 78 BPM | DIASTOLIC BLOOD PRESSURE: 72 MMHG | SYSTOLIC BLOOD PRESSURE: 106 MMHG | RESPIRATION RATE: 18 BRPM | OXYGEN SATURATION: 98 % | BODY MASS INDEX: 21.6 KG/M2 | HEIGHT: 60 IN

## 2024-01-10 DIAGNOSIS — Z71.84 TRAVEL ADVICE ENCOUNTER: Primary | ICD-10-CM

## 2024-01-10 PROCEDURE — 3078F DIAST BP <80 MM HG: CPT | Performed by: FAMILY MEDICINE

## 2024-01-10 PROCEDURE — 3074F SYST BP LT 130 MM HG: CPT | Performed by: FAMILY MEDICINE

## 2024-01-10 PROCEDURE — 99213 OFFICE O/P EST LOW 20 MIN: CPT | Performed by: FAMILY MEDICINE

## 2024-01-10 PROCEDURE — 3008F BODY MASS INDEX DOCD: CPT | Performed by: FAMILY MEDICINE

## 2024-01-10 RX ORDER — ATOVAQUONE AND PROGUANIL HYDROCHLORIDE 250; 100 MG/1; MG/1
1 TABLET, FILM COATED ORAL DAILY
Qty: 30 TABLET | Refills: 0 | Status: SHIPPED | OUTPATIENT
Start: 2024-01-10 | End: 2024-02-09

## 2024-01-10 RX ORDER — AZITHROMYCIN 250 MG/1
TABLET, FILM COATED ORAL
Qty: 6 TABLET | Refills: 0 | Status: SHIPPED | OUTPATIENT
Start: 2024-01-10 | End: 2024-01-15

## 2024-01-10 NOTE — PROGRESS NOTES
Subjective:   Patient ID: Pia Sosa is a 63 year old female.    Patient presents for travel advice encounter for upcoming trip to [facility] as below.        History/Other:   Review of Systems  Current Outpatient Medications   Medication Sig Dispense Refill    Typhoid Vaccine Oral Capsule Delayed Release Take 1 capsule by mouth every other day. 4 capsule 0    Atovaquone-Proguanil HCl (MALARONE) 250-100 MG Oral Tab Take 1 tablet by mouth daily. Start 1 day before travel and continue until 7 days after return. 30 tablet 0    azithromycin 250 MG Oral Tab Take 2 tablets (500 mg total) by mouth daily for 1 day, THEN 1 tablet (250 mg total) daily for 4 days. If needed for traveler's diarrhea.. 6 tablet 0    Multiple Vitamins-Minerals (WOMENS 50+ MULTI VITAMIN/MIN) Oral Tab Take by mouth.      B Complex-C-Folic Acid ( VITAMIN B COMPLEX/VITAMIN C) Oral Tab Take by mouth.      Flaxseed, Linseed, (FLAXSEED OIL) 1000 MG Oral Cap Take by mouth.      Chlorhexidine Gluconate 0.12 % Mouth/Throat Solution USE 1 RINSE WITH 15ML TWICE DAILY THEN EXPECTORATE (Patient not taking: Reported on 7/25/2023)      Coenzyme Q-10 100 MG Oral Cap Take 100 mg by mouth daily. (Patient not taking: Reported on 7/25/2023)       Allergies:No Known Allergies    Objective:   Physical Exam  Constitutional:       Appearance: Normal appearance.   Cardiovascular:      Rate and Rhythm: Normal rate and regular rhythm.   Neurological:      Mental Status: She is alert.         Assessment & Plan:   1. Travel advice encounter    Patient will be traveling to [facility] 1/24/2024 for 21 days.  She will be staying in [facility] in [facility].  She has travel to this destination in the past, no history of illness.  Reviewed travel health recommendations.  - Tdap up-to-date  - Hepatitis A vaccines completed  - Flu and COVID vaccines up-to-date  - Discussed insect bite prevention  - Discussed traveler's diarrhea prevention  - Oral typhoid vaccine sent to pharmacy.   Discussed instructions and side effects.  - Malarone Rx sent to pharmacy, reviewed instructions and side effects  - Azithromycin Rx sent to pharmacy.  Discussed use if needed.    No orders of the defined types were placed in this encounter.      Meds This Visit:  Requested Prescriptions     Signed Prescriptions Disp Refills    Typhoid Vaccine Oral Capsule Delayed Release 4 capsule 0     Sig: Take 1 capsule by mouth every other day.    Atovaquone-Proguanil HCl (MALARONE) 250-100 MG Oral Tab 30 tablet 0     Sig: Take 1 tablet by mouth daily. Start 1 day before travel and continue until 7 days after return.    azithromycin 250 MG Oral Tab 6 tablet 0     Sig: Take 2 tablets (500 mg total) by mouth daily for 1 day, THEN 1 tablet (250 mg total) daily for 4 days. If needed for traveler's diarrhea..       Imaging & Referrals:  None

## 2024-08-13 ENCOUNTER — LAB ENCOUNTER (OUTPATIENT)
Dept: LAB | Age: 64
End: 2024-08-13
Attending: FAMILY MEDICINE
Payer: COMMERCIAL

## 2024-08-13 ENCOUNTER — OFFICE VISIT (OUTPATIENT)
Dept: FAMILY MEDICINE CLINIC | Facility: CLINIC | Age: 64
End: 2024-08-13

## 2024-08-13 VITALS
WEIGHT: 106 LBS | BODY MASS INDEX: 20.27 KG/M2 | OXYGEN SATURATION: 96 % | TEMPERATURE: 98 F | DIASTOLIC BLOOD PRESSURE: 63 MMHG | HEIGHT: 60.63 IN | HEART RATE: 64 BPM | SYSTOLIC BLOOD PRESSURE: 91 MMHG

## 2024-08-13 DIAGNOSIS — Z00.00 ROUTINE PHYSICAL EXAMINATION: ICD-10-CM

## 2024-08-13 DIAGNOSIS — Z00.00 ROUTINE PHYSICAL EXAMINATION: Primary | ICD-10-CM

## 2024-08-13 DIAGNOSIS — Z12.31 VISIT FOR SCREENING MAMMOGRAM: ICD-10-CM

## 2024-08-13 LAB
ANION GAP SERPL CALC-SCNC: 6 MMOL/L (ref 0–18)
BUN BLD-MCNC: 14 MG/DL (ref 9–23)
BUN/CREAT SERPL: 15.7 (ref 10–20)
CALCIUM BLD-MCNC: 9.9 MG/DL (ref 8.7–10.4)
CHLORIDE SERPL-SCNC: 107 MMOL/L (ref 98–112)
CHOLEST SERPL-MCNC: 220 MG/DL (ref ?–200)
CO2 SERPL-SCNC: 30 MMOL/L (ref 21–32)
CREAT BLD-MCNC: 0.89 MG/DL
DEPRECATED RDW RBC AUTO: 43.8 FL (ref 35.1–46.3)
EGFRCR SERPLBLD CKD-EPI 2021: 74 ML/MIN/1.73M2 (ref 60–?)
ERYTHROCYTE [DISTWIDTH] IN BLOOD BY AUTOMATED COUNT: 12.4 % (ref 11–15)
FASTING PATIENT LIPID ANSWER: YES
FASTING STATUS PATIENT QL REPORTED: YES
GLUCOSE BLD-MCNC: 95 MG/DL (ref 70–99)
HCT VFR BLD AUTO: 38.4 %
HDLC SERPL-MCNC: 83 MG/DL (ref 40–59)
HGB BLD-MCNC: 12.7 G/DL
LDLC SERPL CALC-MCNC: 127 MG/DL (ref ?–100)
MCH RBC QN AUTO: 31.4 PG (ref 26–34)
MCHC RBC AUTO-ENTMCNC: 33.1 G/DL (ref 31–37)
MCV RBC AUTO: 95 FL
NONHDLC SERPL-MCNC: 137 MG/DL (ref ?–130)
OSMOLALITY SERPL CALC.SUM OF ELEC: 296 MOSM/KG (ref 275–295)
PLATELET # BLD AUTO: 213 10(3)UL (ref 150–450)
POTASSIUM SERPL-SCNC: 4.4 MMOL/L (ref 3.5–5.1)
RBC # BLD AUTO: 4.04 X10(6)UL
SODIUM SERPL-SCNC: 143 MMOL/L (ref 136–145)
TRIGL SERPL-MCNC: 57 MG/DL (ref 30–149)
VLDLC SERPL CALC-MCNC: 10 MG/DL (ref 0–30)
WBC # BLD AUTO: 2.7 X10(3) UL (ref 4–11)

## 2024-08-13 PROCEDURE — 85027 COMPLETE CBC AUTOMATED: CPT

## 2024-08-13 PROCEDURE — 80048 BASIC METABOLIC PNL TOTAL CA: CPT

## 2024-08-13 PROCEDURE — 3008F BODY MASS INDEX DOCD: CPT | Performed by: FAMILY MEDICINE

## 2024-08-13 PROCEDURE — 3078F DIAST BP <80 MM HG: CPT | Performed by: FAMILY MEDICINE

## 2024-08-13 PROCEDURE — 80061 LIPID PANEL: CPT

## 2024-08-13 PROCEDURE — 99396 PREV VISIT EST AGE 40-64: CPT | Performed by: FAMILY MEDICINE

## 2024-08-13 PROCEDURE — 36415 COLL VENOUS BLD VENIPUNCTURE: CPT

## 2024-08-13 PROCEDURE — 3074F SYST BP LT 130 MM HG: CPT | Performed by: FAMILY MEDICINE

## 2024-08-13 RX ORDER — ATOVAQUONE AND PROGUANIL HYDROCHLORIDE 250; 100 MG/1; MG/1
1 TABLET, FILM COATED ORAL DAILY
Qty: 30 TABLET | Refills: 0 | Status: SHIPPED | OUTPATIENT
Start: 2024-08-13 | End: 2024-09-12

## 2024-08-13 NOTE — PROGRESS NOTES
Subjective:   Patient ID: Pia Sosa is a 63 year old female.    Patient presents for routine physical.  Generally feeling well.        History/Other:   Review of Systems   Constitutional: Negative.    Respiratory: Negative.     Cardiovascular: Negative.    Gastrointestinal: Negative.    Skin: Negative.    Neurological: Negative.      Current Outpatient Medications   Medication Sig Dispense Refill    Atovaquone-Proguanil HCl (MALARONE) 250-100 MG Oral Tab Take 1 tablet by mouth daily. Start 1 day before travel and continue until 7 days after return. 30 tablet 0    Multiple Vitamins-Minerals (WOMENS 50+ MULTI VITAMIN/MIN) Oral Tab Take by mouth.      Flaxseed, Linseed, (FLAXSEED OIL) 1000 MG Oral Cap Take by mouth.       Allergies:No Known Allergies    Objective:   Physical Exam  Constitutional:       Appearance: Normal appearance.   Cardiovascular:      Rate and Rhythm: Normal rate and regular rhythm.      Heart sounds: Normal heart sounds.   Pulmonary:      Effort: Pulmonary effort is normal.      Breath sounds: Normal breath sounds.   Chest:   Breasts:     Right: Normal. No mass.      Left: Normal. No mass.   Abdominal:      Palpations: Abdomen is soft. There is no mass.      Tenderness: There is no abdominal tenderness.   Lymphadenopathy:      Cervical: No cervical adenopathy.      Upper Body:      Right upper body: No axillary adenopathy.      Left upper body: No axillary adenopathy.   Skin:     General: Skin is warm and dry.   Neurological:      Mental Status: She is alert and oriented to person, place, and time.         Assessment & Plan:   1. Routine physical examination-patient is , 1 son, postmenopausal without spotting.  Continuing to work as yoga  instructor.  Exercising regularly  Fasting labs done today   May travel to our strong in [facility] again this winter.  Written Rx given for Malarone as directed, reviewed instructions.  Colon cancer screening up-to-date.   2. Visit for screening  mammogram-due next month, order given.       Orders Placed This Encounter   Procedures    Lipid Panel [E]    CBC, Platelet, No Differential [E]    Basic Metabolic Panel (8) [E]       Meds This Visit:  Requested Prescriptions     Signed Prescriptions Disp Refills    Atovaquone-Proguanil HCl (MALARONE) 250-100 MG Oral Tab 30 tablet 0     Sig: Take 1 tablet by mouth daily. Start 1 day before travel and continue until 7 days after return.       Imaging & Referrals:  [facility] CHACHO 2D+3D SCREENING BILAT (CPT=77067/05124)

## 2024-10-02 ENCOUNTER — HOSPITAL ENCOUNTER (OUTPATIENT)
Dept: MAMMOGRAPHY | Age: 64
Discharge: HOME | End: 2024-10-02
Attending: FAMILY MEDICINE
Payer: COMMERCIAL

## 2024-10-02 DIAGNOSIS — Z12.31 VISIT FOR SCREENING MAMMOGRAM: ICD-10-CM

## 2024-10-02 PROCEDURE — 77063 BREAST TOMOSYNTHESIS BI: CPT | Performed by: FAMILY MEDICINE

## 2024-10-02 PROCEDURE — 77067 SCR MAMMO BI INCL CAD: CPT | Performed by: FAMILY MEDICINE

## 2025-01-13 RX ORDER — ATOVAQUONE AND PROGUANIL HYDROCHLORIDE 250; 100 MG/1; MG/1
TABLET, FILM COATED ORAL
Qty: 30 TABLET | Refills: 0 | Status: SHIPPED | OUTPATIENT
Start: 2025-01-13

## 2025-01-13 NOTE — TELEPHONE ENCOUNTER
Called patient in regards to RX     Patient has go to [place] at the end of January and would like to have this medication   Atovaquone-Proguanil HCl (MALARONE) 250-100 MG Oral Tab       Medication pended for your review / approval    
Dizziness

## 2025-07-09 ENCOUNTER — OFFICE VISIT (OUTPATIENT)
Dept: FAMILY MEDICINE CLINIC | Facility: CLINIC | Age: 65
End: 2025-07-09
Payer: COMMERCIAL

## 2025-07-09 VITALS
HEART RATE: 61 BPM | BODY MASS INDEX: 19.89 KG/M2 | WEIGHT: 104 LBS | SYSTOLIC BLOOD PRESSURE: 93 MMHG | HEIGHT: 60.63 IN | DIASTOLIC BLOOD PRESSURE: 61 MMHG | OXYGEN SATURATION: 98 %

## 2025-07-09 DIAGNOSIS — Z12.31 VISIT FOR SCREENING MAMMOGRAM: ICD-10-CM

## 2025-07-09 DIAGNOSIS — Z00.00 ROUTINE PHYSICAL EXAMINATION: Primary | ICD-10-CM

## 2025-07-09 DIAGNOSIS — H35.30 MACULAR DEGENERATION OF BOTH EYES, UNSPECIFIED TYPE: ICD-10-CM

## 2025-07-09 LAB
ANION GAP SERPL CALC-SCNC: 8 MMOL/L (ref 0–18)
CALCIUM BLD-MCNC: 9.7 MG/DL (ref 8.7–10.4)
CHLORIDE SERPL-SCNC: 104 MMOL/L (ref 98–112)
CHOLEST SERPL-MCNC: 195 MG/DL (ref ?–200)
CO2 SERPL-SCNC: 28 MMOL/L (ref 21–32)
CREAT BLD-MCNC: 0.85 MG/DL (ref 0.55–1.02)
DEPRECATED RDW RBC AUTO: 46.5 FL (ref 35.1–46.3)
EGFRCR SERPLBLD CKD-EPI 2021: 77 ML/MIN/1.73M2 (ref 60–?)
ERYTHROCYTE [DISTWIDTH] IN BLOOD BY AUTOMATED COUNT: 12.8 % (ref 11–15)
FASTING PATIENT LIPID ANSWER: YES
FASTING STATUS PATIENT QL REPORTED: YES
GLUCOSE BLD-MCNC: 82 MG/DL (ref 70–99)
HCT VFR BLD AUTO: 40.5 % (ref 35–48)
HDLC SERPL-MCNC: 80 MG/DL (ref 40–59)
HGB BLD-MCNC: 12.5 G/DL (ref 12–16)
LDLC SERPL CALC-MCNC: 96 MG/DL (ref ?–100)
MCH RBC QN AUTO: 30.5 PG (ref 26–34)
MCHC RBC AUTO-ENTMCNC: 30.9 G/DL (ref 31–37)
MCV RBC AUTO: 98.8 FL (ref 80–100)
NONHDLC SERPL-MCNC: 115 MG/DL (ref ?–130)
PLATELET # BLD AUTO: 302 10(3)UL (ref 150–450)
RBC # BLD AUTO: 4.1 X10(6)UL (ref 3.8–5.3)
SODIUM SERPL-SCNC: 140 MMOL/L (ref 136–145)
TRIGL SERPL-MCNC: 112 MG/DL (ref 30–149)
VLDLC SERPL CALC-MCNC: 18 MG/DL (ref 0–30)
WBC # BLD AUTO: 4.3 X10(3) UL (ref 4–11)

## 2025-07-09 PROCEDURE — 90677 PCV20 VACCINE IM: CPT | Performed by: FAMILY MEDICINE

## 2025-07-09 PROCEDURE — 3074F SYST BP LT 130 MM HG: CPT | Performed by: FAMILY MEDICINE

## 2025-07-09 PROCEDURE — 3078F DIAST BP <80 MM HG: CPT | Performed by: FAMILY MEDICINE

## 2025-07-09 PROCEDURE — 99396 PREV VISIT EST AGE 40-64: CPT | Performed by: FAMILY MEDICINE

## 2025-07-09 PROCEDURE — 3008F BODY MASS INDEX DOCD: CPT | Performed by: FAMILY MEDICINE

## 2025-07-09 PROCEDURE — 90471 IMMUNIZATION ADMIN: CPT | Performed by: FAMILY MEDICINE

## 2025-07-09 NOTE — PROGRESS NOTES
The following individual(s) verbally consented to be recorded using ambient AI listening technology and understand that they can each withdraw their consent to this listening technology at any point by asking the clinician to turn off or pause the recording:    Patient name: Pia Sosa  Additional names:

## 2025-07-09 NOTE — PROGRESS NOTES
Subjective:   Patient ID: Pia Sosa is a 64 year old female.    Patient presents for routine physical.        History/Other:   Review of Systems   Constitutional: Negative.    Respiratory: Negative.     Cardiovascular: Negative.    Gastrointestinal: Negative.    Skin: Negative.    Neurological: Negative.      Current Medications[1]  Allergies:Allergies[2]    Objective:   Physical Exam  Constitutional:       Appearance: Normal appearance.   Cardiovascular:      Rate and Rhythm: Normal rate and regular rhythm.      Heart sounds: Normal heart sounds.   Pulmonary:      Effort: Pulmonary effort is normal.      Breath sounds: Normal breath sounds.   Chest:   Breasts:     Right: Normal. No mass.      Left: Normal. No mass.   Abdominal:      Palpations: Abdomen is soft. There is no mass.      Tenderness: There is no abdominal tenderness.   Lymphadenopathy:      Cervical: No cervical adenopathy.      Upper Body:      Right upper body: No axillary adenopathy.      Left upper body: No axillary adenopathy.   Skin:     General: Skin is warm and dry.   Neurological:      Mental Status: She is alert and oriented to person, place, and time.         Assessment & Plan:   1. Routine physical examination-patient is , has 1 son, working as a .  Exercising with yoga but also walking new dog 3 times a day.  Up-to-date Pap smear  Colon cancer screening up-to-date  Labs as ordered  PCV given today     2. Visit for screening mammogram-order given     3. Macular degeneration of both eyes, unspecified type-prediabetes ophthalmology not on current insurance.  Referral given for Dr. Shetty.       Orders Placed This Encounter   Procedures    Lipid Panel [E]    CBC, Platelet, No Differential [E]    Basic Metabolic Panel (8) [E]    Prevnar 20 (PCV20) [93741]       Meds This Visit:  Requested Prescriptions      No prescriptions requested or ordered in this encounter       Imaging & Referrals:  PCV20 VACCINE FOR  INTRAMUSCULAR USE  OPHTHALMOLOGY - INTERNAL  [facility] CHACHO 2D+3D SCREENING BILAT (CPT=77067/55944)         [1]   Current Outpatient Medications   Medication Sig Dispense Refill    Omega-3 Fatty Acids (FISH OIL) 300 MG Oral Cap Take by mouth.      Multiple Vitamins-Minerals (WOMENS 50+ MULTI VITAMIN/MIN) Oral Tab Take by mouth.     [2] No Known Allergies

## 2025-07-11 ENCOUNTER — TELEPHONE (OUTPATIENT)
Dept: FAMILY MEDICINE CLINIC | Facility: CLINIC | Age: 65
End: 2025-07-11

## 2025-07-11 NOTE — TELEPHONE ENCOUNTER
Dr. Shetty is showing as out of network with [employer]. Patient can contact [facility] to find an in network provider and request a new referral. Referral for Dr. Shetty has been canceled.

## (undated) DEVICE — ENDOSCOPY PACK - LOWER: Brand: MEDLINE INDUSTRIES, INC.

## (undated) DEVICE — Device: Brand: DEFENDO AIR/WATER/SUCTION AND BIOPSY VALVE